# Patient Record
Sex: FEMALE | Race: WHITE | NOT HISPANIC OR LATINO | Employment: OTHER | URBAN - METROPOLITAN AREA
[De-identification: names, ages, dates, MRNs, and addresses within clinical notes are randomized per-mention and may not be internally consistent; named-entity substitution may affect disease eponyms.]

---

## 2018-08-21 ENCOUNTER — OFFICE VISIT (OUTPATIENT)
Dept: PODIATRY | Facility: CLINIC | Age: 68
End: 2018-08-21
Payer: MEDICARE

## 2018-08-21 VITALS — BODY MASS INDEX: 24.01 KG/M2 | HEIGHT: 61 IN | WEIGHT: 127.19 LBS

## 2018-08-21 DIAGNOSIS — L40.9 PSORIASIS: ICD-10-CM

## 2018-08-21 DIAGNOSIS — I87.2 VENOUS INSUFFICIENCY OF BOTH LOWER EXTREMITIES: ICD-10-CM

## 2018-08-21 DIAGNOSIS — M20.42 HAMMERTOES OF BOTH FEET: ICD-10-CM

## 2018-08-21 DIAGNOSIS — G63 POLYNEUROPATHY ASSOCIATED WITH UNDERLYING DISEASE: ICD-10-CM

## 2018-08-21 DIAGNOSIS — M20.10 HALLUX VALGUS WITH BUNIONS, UNSPECIFIED LATERALITY: ICD-10-CM

## 2018-08-21 DIAGNOSIS — R60.0 LOCALIZED EDEMA: ICD-10-CM

## 2018-08-21 DIAGNOSIS — I73.9 PAD (PERIPHERAL ARTERY DISEASE): ICD-10-CM

## 2018-08-21 DIAGNOSIS — M21.619 HALLUX VALGUS WITH BUNIONS, UNSPECIFIED LATERALITY: ICD-10-CM

## 2018-08-21 DIAGNOSIS — I73.00 RAYNAUD'S PHENOMENON WITHOUT GANGRENE: Primary | ICD-10-CM

## 2018-08-21 DIAGNOSIS — M21.622 TAILOR'S BUNION OF BOTH FEET: ICD-10-CM

## 2018-08-21 DIAGNOSIS — M21.621 TAILOR'S BUNION OF BOTH FEET: ICD-10-CM

## 2018-08-21 DIAGNOSIS — M20.41 HAMMERTOES OF BOTH FEET: ICD-10-CM

## 2018-08-21 DIAGNOSIS — L40.50 PSORIATIC ARTHRITIS: ICD-10-CM

## 2018-08-21 PROCEDURE — 99203 OFFICE O/P NEW LOW 30 MIN: CPT | Mod: S$PBB,,, | Performed by: PODIATRIST

## 2018-08-21 PROCEDURE — 99203 OFFICE O/P NEW LOW 30 MIN: CPT | Mod: PBBFAC,PN | Performed by: PODIATRIST

## 2018-08-21 PROCEDURE — 99999 PR PBB SHADOW E&M-NEW PATIENT-LVL III: CPT | Mod: PBBFAC,,, | Performed by: PODIATRIST

## 2018-08-21 RX ORDER — ENALAPRIL MALEATE 5 MG/1
5 TABLET ORAL DAILY
COMMUNITY

## 2018-08-21 RX ORDER — ALCOHOL 2.38 KG/3.79L
1 GEL TOPICAL DAILY
Qty: 30 CAPSULE | Refills: 2 | Status: SHIPPED | OUTPATIENT
Start: 2018-08-21 | End: 2018-09-20

## 2018-09-25 NOTE — PATIENT INSTRUCTIONS
- Check feet daily for wounds and areas of irritation.    - Apply lotion to feet and ankles daily but not between toes.    - Keep feet clean and dry, especially between toes.    - Elevate feet as much as possible throughout the day.    - Minimize exposure to temperature changes by wearing socks at all times and try wool socks during the winter and on cool, rainy days.    - Apply OTC topical arnica as needed for pain.    - Try yoga toes/flexi-toes to strengthen pedal muscles and use toe spacers and bunion splints to decrease rate of progression of pedal deformities.    - Wear supportive/accommodative shoes at all times when ambulating.    - Supplement with alpha lipoic acid and vitamin B complex to reduce and repair nerve damage.    - Notify clinic immediately of any new or worsening conditions.    - Follow up in 4 weeks for nail care.

## 2018-09-25 NOTE — PROGRESS NOTES
Subjective:      Patient ID: Julia Gerardo is a 68 y.o. female.    Chief Complaint: Foot Problem (cracked skin on right foot  PCP  Markel  6/2018)    HPI:  Patient presents to clinic complaining of getting cracked skin on her right great toe resulting in small wounds but states that it has healed up already.  She understands that problem cannot be diagnosed since it was solved and inquires about her toes turning blue and feeling cold most of the time, which she states is often painful.  She also queries what can be done if anything without surgery for her bunions and spider veins.  Patient denies any pain from bunions on a daily basis due to avoiding certain shoes.  She reports no pain from spider veins but does relate noticing swelling without any cause that she can think of and denies eating a high salt diet.  Patient denies any other pedal complaints at this time.    Review of Systems   Constitution: Negative for chills, diaphoresis and fever.   Cardiovascular: Positive for leg swelling. Negative for claudication and cyanosis.   Respiratory: Negative for cough, shortness of breath and wheezing.    Endocrine: Negative for cold intolerance, heat intolerance, polydipsia, polyphagia and polyuria.   Hematologic/Lymphatic: Negative for bleeding problem. Does not bruise/bleed easily.   Skin: Positive for color change and unusual hair distribution. Negative for dry skin, itching, nail changes, poor wound healing, rash, skin cancer and suspicious lesions.   Musculoskeletal: Negative for arthritis, back pain, falls, gout, joint pain, joint swelling, muscle cramps, muscle weakness, myalgias and stiffness.   Gastrointestinal: Negative for change in bowel habit, constipation, diarrhea, nausea and vomiting.   Neurological: Positive for numbness and sensory change. Negative for disturbances in coordination, dizziness, focal weakness, loss of balance and paresthesias.           Objective:      Bilateral pedal exam was  performed today.  Physical Exam   Constitutional: She is oriented to person, place, and time. She appears well-developed and well-nourished. No distress.   HENT:   Head: Normocephalic and atraumatic.   Eyes: Conjunctivae and EOM are normal. Pupils are equal, round, and reactive to light.   Neck: Normal range of motion.   Cardiovascular:   Pulses:       Dorsalis pedis pulses are 1+ on the right side, and 1+ on the left side.        Posterior tibial pulses are 1+ on the right side, and 1+ on the left side.   Pedal pulses palpable 1/4 DP & PT Bilateral LE.  CFT is < 5 seconds to Bilateral hallux.  Skin temperature is cool to cold proximal tibia to distal toes Bilateral LE without localized increase in calor noted. +1/4 pitting edema noted B/L LE.  Mild, localized erythema and edema without ecchymosis noted right foot bunion.  Hair growth absent distally Bilateral LE.  Diffuse telangectasias and reticular veins noted B/L LE.  Toes 1-5 B/L foot exhibit dusky blue appearance.     Pulmonary/Chest: Effort normal and breath sounds normal. No respiratory distress. She has no wheezes.   Musculoskeletal: She exhibits edema and deformity. She exhibits no tenderness.        Right foot: There is decreased range of motion and deformity.        Left foot: There is decreased range of motion and deformity.   Generalized decreased ROM noted to B/L ankle and pedal joints without pain or crepitus.  Bilateral ankle joint dorsiflexion is restricted with the knee extended and flexed per Silfverskiold exam.  Muscle strength is 5/5 for all bilateral muscle groups tested.  Bilateral hallux is abducted on the 1st ray.  Large dorsomedial prominence noted to the 1st MTPJ bilateral foot.  ROM in the aligned position is decreased with ROM in the track bound position decreased.  Right foot bunion deformity is Moderate and left foot bunion deformity is mild in severity.  Bilateral 5th digit is abducted on the 5th ray.  Dorsal lateral bony prominence  noted to the lateral aspect of the 5th MTPJ bilateral foot.  Bilateral 5th digit is noted exhibiting flexion contracture with adductovarus component.  Semi-flexible flexion contracture noted to digits 2-5 B/L foot.   Feet:   Right Foot:   Protective Sensation: 10 sites tested. 2 sites sensed.   Skin Integrity: Positive for erythema. Negative for ulcer, blister, skin breakdown, warmth, callus or dry skin.   Left Foot:   Protective Sensation: 10 sites tested. 3 sites sensed.   Skin Integrity: Negative for ulcer, blister, skin breakdown, erythema, warmth, callus or dry skin.   Neurological: She is alert and oriented to person, place, and time. She has normal strength. She displays no atrophy and normal reflexes. A sensory deficit is present. She exhibits normal muscle tone. Coordination and gait normal. She displays no Babinski's sign on the right side. She displays no Babinski's sign on the left side.   Reflex Scores:       Achilles reflexes are 2+ on the right side and 2+ on the left side.  Protective sensation is intact to 2/10 sites on the right foot and to 3/10 sites on the left foot via Dardanelle-Tori monofilament.    Proprioception is Absent to the right foot and Absent to the left foot.  Vibratory sensation is Absent to the right foot and Absent to the left foot.   Light touch sensation is Decreased to the right foot and Decreased to the left foot.   Achilles DTR and Chaddock STR are Intact to bilateral lower extremities.   Negative Babinski sign bilateral lower extremities.  Negative clonus sign bilateral lower extremities.     Skin: Skin is warm, dry and intact. Capillary refill takes more than 3 seconds. No abrasion, no bruising, no burn, no ecchymosis, no laceration, no lesion, no petechiae and no rash noted. She is not diaphoretic. There is erythema. No cyanosis. Nails show no clubbing.   Toenails 1-5 B/L are WNL in length and thickness.  Webspaces 1-4 B/L are clean, dry, and intact.  Skin turgor is  increased.  Skin texture is dry, shiny, and atrophic B/L LE.  No open wounds or suspicious pigmented lesions appreciable B/L foot or ankle.     Psychiatric: She has a normal mood and affect. Her behavior is normal. Judgment and thought content normal.   Nursing note and vitals reviewed.        Assessment:       Encounter Diagnoses   Name Primary?    Raynaud's phenomenon without gangrene Yes    Venous insufficiency of both lower extremities     Hallux valgus with bunions, unspecified laterality     Tailor's bunion of both feet     Hammertoes of both feet     Localized edema - Right Foot     PAD (peripheral artery disease)     Polyneuropathy associated with underlying disease     Psoriasis     Psoriatic arthritis          Plan:       Julia was seen today for foot problem.    Diagnoses and all orders for this visit:    Raynaud's phenomenon without gangrene    Venous insufficiency of both lower extremities    Hallux valgus with bunions, unspecified laterality    Tailor's bunion of both feet    Hammertoes of both feet    Localized edema - Right Foot    PAD (peripheral artery disease)    Polyneuropathy associated with underlying disease  -     [id number] oil 3 mg-35 mg-2 mg -90.314 mg Cap; Take 1 capsule by mouth once daily.    Psoriasis    Psoriatic arthritis      I counseled the patient on her conditions, their implications and medical management.    - Discussed with patient conservative treatment options for pedal deformities.    - Advised patient of treatment options for spiders veins.  Patient deferred referral to vascular at this time.    - Rx Metanx for neuropathy.    Patient was given the following recommendations and instructions:  Patient Instructions   - Check feet daily for wounds and areas of irritation.    - Apply lotion to feet and ankles daily but not between toes.    - Keep feet clean and dry, especially between toes.    - Elevate feet as much as possible throughout the day.    -  Minimize exposure to temperature changes by wearing socks at all times and try wool socks during the winter and on cool, rainy days.    - Apply OTC topical arnica as needed for pain.    - Try yoga toes/flexi-toes to strengthen pedal muscles and use toe spacers and bunion splints to decrease rate of progression of pedal deformities.    - Wear supportive/accommodative shoes at all times when ambulating.    - Supplement with alpha lipoic acid and vitamin B complex to reduce and repair nerve damage.    - Notify clinic immediately of any new or worsening conditions.    - Follow up in 4 weeks for nail care.        Ayesha Singh DPM

## 2021-07-21 ENCOUNTER — OFFICE VISIT (OUTPATIENT)
Dept: URGENT CARE | Facility: CLINIC | Age: 71
End: 2021-07-21
Payer: MEDICARE

## 2021-07-21 VITALS
BODY MASS INDEX: 23.98 KG/M2 | WEIGHT: 127 LBS | DIASTOLIC BLOOD PRESSURE: 85 MMHG | HEART RATE: 77 BPM | HEIGHT: 61 IN | TEMPERATURE: 98 F | SYSTOLIC BLOOD PRESSURE: 139 MMHG | OXYGEN SATURATION: 97 % | RESPIRATION RATE: 16 BRPM

## 2021-07-21 DIAGNOSIS — R05.9 COUGH: ICD-10-CM

## 2021-07-21 DIAGNOSIS — J06.9 UPPER RESPIRATORY TRACT INFECTION, UNSPECIFIED TYPE: Primary | ICD-10-CM

## 2021-07-21 LAB
CTP QC/QA: YES
SARS-COV-2 RDRP RESP QL NAA+PROBE: NEGATIVE

## 2021-07-21 PROCEDURE — 99203 PR OFFICE/OUTPT VISIT, NEW, LEVL III, 30-44 MIN: ICD-10-PCS | Mod: S$GLB,,, | Performed by: PHYSICIAN ASSISTANT

## 2021-07-21 PROCEDURE — U0002: ICD-10-PCS | Mod: QW,S$GLB,, | Performed by: PHYSICIAN ASSISTANT

## 2021-07-21 PROCEDURE — 3008F BODY MASS INDEX DOCD: CPT | Mod: CPTII,S$GLB,, | Performed by: PHYSICIAN ASSISTANT

## 2021-07-21 PROCEDURE — 3008F PR BODY MASS INDEX (BMI) DOCUMENTED: ICD-10-PCS | Mod: CPTII,S$GLB,, | Performed by: PHYSICIAN ASSISTANT

## 2021-07-21 PROCEDURE — 99203 OFFICE O/P NEW LOW 30 MIN: CPT | Mod: S$GLB,,, | Performed by: PHYSICIAN ASSISTANT

## 2021-07-21 PROCEDURE — U0002 COVID-19 LAB TEST NON-CDC: HCPCS | Mod: QW,S$GLB,, | Performed by: PHYSICIAN ASSISTANT

## 2021-07-21 RX ORDER — AZELASTINE 1 MG/ML
1 SPRAY, METERED NASAL 2 TIMES DAILY
Qty: 30 ML | Refills: 0 | Status: SHIPPED | OUTPATIENT
Start: 2021-07-21 | End: 2022-07-21